# Patient Record
Sex: FEMALE | Race: WHITE
[De-identification: names, ages, dates, MRNs, and addresses within clinical notes are randomized per-mention and may not be internally consistent; named-entity substitution may affect disease eponyms.]

---
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
## 2018-09-02 ENCOUNTER — HOSPITAL ENCOUNTER (INPATIENT)
Age: 18
LOS: 2 days | Discharge: HOME | DRG: 781 | End: 2018-09-04

## 2018-09-02 ENCOUNTER — HOSPITAL ENCOUNTER (INPATIENT)
Dept: HOSPITAL 91 - OBT | Age: 18
LOS: 2 days | Discharge: HOME | DRG: 781 | End: 2018-09-04
Payer: COMMERCIAL

## 2018-09-02 DIAGNOSIS — Z3A.30: ICD-10-CM

## 2018-09-02 DIAGNOSIS — O23.43: Primary | ICD-10-CM

## 2018-09-02 DIAGNOSIS — O47.1: ICD-10-CM

## 2018-09-02 LAB
ADD MAN DIFF?: NO
ADD UMIC: YES
BASOPHIL #: 0 10^3/UL (ref 0–0.1)
BASOPHILS %: 0.3 % (ref 0–2)
EOSINOPHILS #: 0.1 10^3/UL (ref 0–0.5)
EOSINOPHILS %: 0.8 % (ref 0–7)
FETAL FIBRONECTIN: NEGATIVE
HEMATOCRIT: 32.7 % (ref 37–47)
HEMOGLOBIN: 10.9 G/DL (ref 12–16)
IMMATURE GRANS #M: 0.09 10^3/UL (ref 0–0.03)
IMMATURE GRANS % (M): 0.9 % (ref 0–0.43)
LYMPHOCYTES #: 2.6 10^3/UL (ref 0.8–2.9)
LYMPHOCYTES %: 26.3 % (ref 18–55)
MEAN CORPUSCULAR HEMOGLOBIN: 31.5 PG (ref 29–33)
MEAN CORPUSCULAR HGB CONC: 33.3 G/DL (ref 32–37)
MEAN CORPUSCULAR VOLUME: 94.5 FL (ref 72–104)
MEAN PLATELET VOLUME: 10.8 FL (ref 7.4–10.4)
MONOCYTE #: 0.5 10^3/UL (ref 0.3–0.9)
MONOCYTES %: 5.4 % (ref 0–13)
NEUTROPHIL #: 6.6 10^3/UL (ref 1.6–7.5)
NEUTROPHILS %: 66.3 % (ref 30–74)
NUCLEATED RED BLOOD CELLS #: 0 10^3/UL (ref 0–0)
NUCLEATED RED BLOOD CELLS%: 0 /100WBC (ref 0–0)
PLATELET COUNT: 242 10^3/UL (ref 140–415)
RED BLOOD COUNT: 3.46 10^6/UL (ref 4.2–5.4)
RED CELL DISTRIBUTION WIDTH: 12.2 % (ref 11.5–14.5)
UR ASCORBIC ACID: NEGATIVE MG/DL
UR BACTERIA: (no result) /HPF
UR BILIRUBIN (DIP): NEGATIVE MG/DL
UR BLOOD (DIP): (no result) MG/DL
UR CLARITY: (no result)
UR COLOR: YELLOW
UR GLUCOSE (DIP): NEGATIVE MG/DL
UR KETONES (DIP): NEGATIVE MG/DL
UR LEUKOCYTE ESTERASE (DIP): (no result) LEU/UL
UR NITRITE (DIP): NEGATIVE MG/DL
UR PH (DIP): 7 (ref 5–9)
UR RBC: 4 /HPF (ref 0–5)
UR SPECIFIC GRAVITY (DIP): 1.01 (ref 1–1.03)
UR SQUAMOUS EPITHELIAL CELL: (no result) /HPF
UR TOTAL PROTEIN (DIP): NEGATIVE MG/DL
UR UROBILINOGEN (DIP): NEGATIVE MG/DL
UR WBC: 23 /HPF (ref 0–5)
WHITE BLOOD COUNT: 10 10^3/UL (ref 4.8–10.8)

## 2018-09-02 PROCEDURE — 82731 ASSAY OF FETAL FIBRONECTIN: CPT

## 2018-09-02 PROCEDURE — 83735 ASSAY OF MAGNESIUM: CPT

## 2018-09-02 PROCEDURE — 85025 COMPLETE CBC W/AUTO DIFF WBC: CPT

## 2018-09-02 PROCEDURE — 76817 TRANSVAGINAL US OBSTETRIC: CPT

## 2018-09-02 PROCEDURE — 76818 FETAL BIOPHYS PROFILE W/NST: CPT

## 2018-09-02 PROCEDURE — 74181 MRI ABDOMEN W/O CONTRAST: CPT

## 2018-09-02 PROCEDURE — 81001 URINALYSIS AUTO W/SCOPE: CPT

## 2018-09-02 PROCEDURE — 87086 URINE CULTURE/COLONY COUNT: CPT

## 2018-09-02 RX ADMIN — PYRIDOXINE HYDROCHLORIDE 1 MLS/HR: 100 INJECTION, SOLUTION INTRAMUSCULAR; INTRAVENOUS at 23:59

## 2018-09-02 RX ADMIN — CEFAZOLIN SODIUM 1 MLS/HR: 2 SOLUTION INTRAVENOUS at 22:00

## 2018-09-02 RX ADMIN — CEFAZOLIN SODIUM 1 MLS/HR: 2 SOLUTION INTRAVENOUS at 16:35

## 2018-09-02 RX ADMIN — MAGNESIUM SULFATE HEPTAHYDRATE 1 MLS/HR: 40 INJECTION, SOLUTION INTRAVENOUS at 18:17

## 2018-09-02 RX ADMIN — PYRIDOXINE HYDROCHLORIDE 1 MLS/HR: 100 INJECTION, SOLUTION INTRAMUSCULAR; INTRAVENOUS at 16:29

## 2018-09-02 RX ADMIN — MAGNESIUM SULFATE IN WATER 1 MLS/HR: 40 INJECTION, SOLUTION INTRAVENOUS at 18:47

## 2018-09-02 RX ADMIN — BETAMETHASONE SODIUM PHOSPHATE AND BETAMETHASONE ACETATE 1 MG: 3; 3 INJECTION, SUSPENSION INTRA-ARTICULAR; INTRALESIONAL; INTRAMUSCULAR at 16:36

## 2018-09-03 LAB
MAGNESIUM: 4.8 MG/DL (ref 1.7–2.5)
MAGNESIUM: 4.9 MG/DL (ref 1.7–2.5)
MAGNESIUM: 5.2 MG/DL (ref 1.7–2.5)

## 2018-09-03 RX ADMIN — CEFAZOLIN SODIUM 1 MLS/HR: 2 SOLUTION INTRAVENOUS at 13:54

## 2018-09-03 RX ADMIN — MAGNESIUM SULFATE IN WATER 1 MLS/HR: 40 INJECTION, SOLUTION INTRAVENOUS at 10:30

## 2018-09-03 RX ADMIN — CEFAZOLIN SODIUM 1 MLS/HR: 2 SOLUTION INTRAVENOUS at 22:07

## 2018-09-03 RX ADMIN — PYRIDOXINE HYDROCHLORIDE 1 MLS/HR: 100 INJECTION, SOLUTION INTRAMUSCULAR; INTRAVENOUS at 12:53

## 2018-09-03 RX ADMIN — BETAMETHASONE SODIUM PHOSPHATE AND BETAMETHASONE ACETATE 1 MG: 3; 3 INJECTION, SUSPENSION INTRA-ARTICULAR; INTRALESIONAL; INTRAMUSCULAR at 16:36

## 2018-09-03 RX ADMIN — MAGNESIUM SULFATE IN WATER 1 MLS/HR: 40 INJECTION, SOLUTION INTRAVENOUS at 11:47

## 2018-09-03 RX ADMIN — MAGNESIUM SULFATE IN WATER 1 MLS/HR: 40 INJECTION, SOLUTION INTRAVENOUS at 04:59

## 2018-09-03 RX ADMIN — CEFAZOLIN SODIUM 1 MLS/HR: 2 SOLUTION INTRAVENOUS at 05:56

## 2018-09-04 RX ADMIN — CEFAZOLIN SODIUM 1 MLS/HR: 2 SOLUTION INTRAVENOUS at 22:35

## 2018-09-04 RX ADMIN — CEFAZOLIN SODIUM 1 MLS/HR: 2 SOLUTION INTRAVENOUS at 05:46

## 2018-09-04 RX ADMIN — CEFAZOLIN SODIUM 1 MLS/HR: 2 SOLUTION INTRAVENOUS at 14:00

## 2018-09-04 RX ADMIN — PYRIDOXINE HYDROCHLORIDE 1 MLS/HR: 100 INJECTION, SOLUTION INTRAMUSCULAR; INTRAVENOUS at 12:38

## 2018-11-04 ENCOUNTER — HOSPITAL ENCOUNTER (INPATIENT)
Dept: HOSPITAL 91 - OBT | Age: 18
LOS: 2 days | Discharge: HOME | End: 2018-11-06
Payer: COMMERCIAL

## 2018-11-04 ENCOUNTER — HOSPITAL ENCOUNTER (INPATIENT)
Age: 18
LOS: 2 days | Discharge: HOME | End: 2018-11-06

## 2018-11-04 DIAGNOSIS — Z3A.39: ICD-10-CM

## 2018-11-04 LAB
ADD MAN DIFF?: NO
BASOPHIL #: 0 10^3/UL (ref 0–0.1)
BASOPHILS %: 0.2 % (ref 0–2)
EOSINOPHILS #: 0.1 10^3/UL (ref 0–0.5)
EOSINOPHILS %: 1 % (ref 0–7)
HEMATOCRIT: 31.7 % (ref 37–47)
HEMOGLOBIN: 10.2 G/DL (ref 12–16)
HEPATITIS B SURFACE ANTIGEN: NEGATIVE
IMMATURE GRANS #M: 0.05 10^3/UL (ref 0–0.03)
IMMATURE GRANS % (M): 0.4 % (ref 0–0.43)
INR: 0.93
LYMPHOCYTES #: 3.8 10^3/UL (ref 0.8–2.9)
LYMPHOCYTES %: 29.1 % (ref 18–55)
MEAN CORPUSCULAR HEMOGLOBIN: 29.1 PG (ref 29–33)
MEAN CORPUSCULAR HGB CONC: 32.2 G/DL (ref 32–37)
MEAN CORPUSCULAR VOLUME: 90.6 FL (ref 72–104)
MEAN PLATELET VOLUME: 11.4 FL (ref 7.4–10.4)
MONOCYTE #: 0.7 10^3/UL (ref 0.3–0.9)
MONOCYTES %: 5.1 % (ref 0–13)
NEUTROPHIL #: 8.3 10^3/UL (ref 1.6–7.5)
NEUTROPHILS %: 64.2 % (ref 30–74)
NUCLEATED RED BLOOD CELLS #: 0 10^3/UL (ref 0–0)
NUCLEATED RED BLOOD CELLS%: 0 /100WBC (ref 0–0)
PARTIAL THROMBOPLASTIN TIME: 29.7 SEC (ref 23–35)
PLATELET COUNT: 257 10^3/UL (ref 140–415)
PROTIME: 12.5 SEC (ref 11.9–14.9)
PT RATIO: 1
RAPID PLASMA REAGIN: REACTIVE
RED BLOOD COUNT: 3.5 10^6/UL (ref 4.2–5.4)
RED CELL DISTRIBUTION WIDTH: 13.4 % (ref 11.5–14.5)
RPR TITER: (no result)
WHITE BLOOD COUNT: 12.9 10^3/UL (ref 4.8–10.8)

## 2018-11-04 PROCEDURE — 62319: CPT

## 2018-11-04 PROCEDURE — 86592 SYPHILIS TEST NON-TREP QUAL: CPT

## 2018-11-04 PROCEDURE — 85025 COMPLETE CBC W/AUTO DIFF WBC: CPT

## 2018-11-04 PROCEDURE — 87340 HEPATITIS B SURFACE AG IA: CPT

## 2018-11-04 PROCEDURE — 3E033VJ INTRODUCTION OF OTHER HORMONE INTO PERIPHERAL VEIN, PERCUTANEOUS APPROACH: ICD-10-PCS

## 2018-11-04 PROCEDURE — 86900 BLOOD TYPING SEROLOGIC ABO: CPT

## 2018-11-04 PROCEDURE — 80307 DRUG TEST PRSMV CHEM ANLYZR: CPT

## 2018-11-04 PROCEDURE — 86850 RBC ANTIBODY SCREEN: CPT

## 2018-11-04 PROCEDURE — 86901 BLOOD TYPING SEROLOGIC RH(D): CPT

## 2018-11-04 PROCEDURE — 76815 OB US LIMITED FETUS(S): CPT

## 2018-11-04 PROCEDURE — 85610 PROTHROMBIN TIME: CPT

## 2018-11-04 PROCEDURE — 88307 TISSUE EXAM BY PATHOLOGIST: CPT

## 2018-11-04 PROCEDURE — 90715 TDAP VACCINE 7 YRS/> IM: CPT

## 2018-11-04 PROCEDURE — 85730 THROMBOPLASTIN TIME PARTIAL: CPT

## 2018-11-04 RX ADMIN — Medication 1 ML: at 11:56

## 2018-11-04 RX ADMIN — Medication 7 APPLIC: at 17:54

## 2018-11-04 RX ADMIN — PENICILLIN G BENZATHINE 1 UNITS: 2400000 INJECTION, SUSPENSION INTRAMUSCULAR at 08:02

## 2018-11-04 RX ADMIN — IBUPROFEN 1 MG: 600 TABLET ORAL at 15:20

## 2018-11-04 RX ADMIN — HETASTARCH IN SODIUM CHLORIDE 1 ML: 6; .9 INJECTION, SOLUTION INTRAVENOUS at 09:35

## 2018-11-04 RX ADMIN — Medication 56 SPRAY: at 17:53

## 2018-11-04 RX ADMIN — PYRIDOXINE HYDROCHLORIDE 1 MLS/HR: 100 INJECTION, SOLUTION INTRAMUSCULAR; INTRAVENOUS at 08:09

## 2018-11-04 RX ADMIN — PYRIDOXINE HYDROCHLORIDE 1 MLS/HR: 100 INJECTION, SOLUTION INTRAMUSCULAR; INTRAVENOUS at 07:17

## 2018-11-04 RX ADMIN — DOCUSATE SODIUM AND SENNOSIDES 1 TAB: 8.6; 5 TABLET, FILM COATED ORAL at 21:32

## 2018-11-04 RX ADMIN — PYRIDOXINE HYDROCHLORIDE 1 MLS/HR: 100 INJECTION, SOLUTION INTRAMUSCULAR; INTRAVENOUS at 09:22

## 2018-11-04 RX ADMIN — METHYLERGONOVINE MALEATE 1 MG: 0.2 INJECTION, SOLUTION INTRAMUSCULAR; INTRAVENOUS at 11:58

## 2018-11-04 RX ADMIN — Medication 1 MLS/HR: at 09:22

## 2018-11-04 RX ADMIN — PYRIDOXINE HYDROCHLORIDE 1 MLS/HR: 100 INJECTION, SOLUTION INTRAMUSCULAR; INTRAVENOUS at 07:52

## 2018-11-04 RX ADMIN — Medication 1 MLS/HR: at 12:27

## 2018-11-04 RX ADMIN — IBUPROFEN 1 MG: 600 TABLET ORAL at 17:53

## 2018-11-05 LAB
ADD MAN DIFF?: NO
BASOPHIL #: 0 10^3/UL (ref 0–0.1)
BASOPHILS %: 0.2 % (ref 0–2)
EOSINOPHILS #: 0.1 10^3/UL (ref 0–0.5)
EOSINOPHILS %: 1 % (ref 0–7)
HEMATOCRIT: 27.6 % (ref 37–47)
HEMOGLOBIN: 8.7 G/DL (ref 12–16)
IMMATURE GRANS #M: 0.04 10^3/UL (ref 0–0.03)
IMMATURE GRANS % (M): 0.3 % (ref 0–0.43)
LYMPHOCYTES #: 3.7 10^3/UL (ref 0.8–2.9)
LYMPHOCYTES %: 29.2 % (ref 18–55)
MEAN CORPUSCULAR HEMOGLOBIN: 28.8 PG (ref 29–33)
MEAN CORPUSCULAR HGB CONC: 31.5 G/DL (ref 32–37)
MEAN CORPUSCULAR VOLUME: 91.4 FL (ref 72–104)
MEAN PLATELET VOLUME: 11.5 FL (ref 7.4–10.4)
MONOCYTE #: 0.8 10^3/UL (ref 0.3–0.9)
MONOCYTES %: 6.2 % (ref 0–13)
NEUTROPHIL #: 8 10^3/UL (ref 1.6–7.5)
NEUTROPHILS %: 63.1 % (ref 30–74)
NUCLEATED RED BLOOD CELLS #: 0 10^3/UL (ref 0–0)
NUCLEATED RED BLOOD CELLS%: 0 /100WBC (ref 0–0)
PLATELET COUNT: 222 10^3/UL (ref 140–415)
RED BLOOD COUNT: 3.02 10^6/UL (ref 4.2–5.4)
RED CELL DISTRIBUTION WIDTH: 13.5 % (ref 11.5–14.5)
WHITE BLOOD COUNT: 12.7 10^3/UL (ref 4.8–10.8)

## 2018-11-05 RX ADMIN — IBUPROFEN 1 MG: 600 TABLET ORAL at 17:52

## 2018-11-05 RX ADMIN — IBUPROFEN 1 MG: 600 TABLET ORAL at 11:35

## 2018-11-05 RX ADMIN — IBUPROFEN 1 MG: 600 TABLET ORAL at 05:31

## 2018-11-05 RX ADMIN — IBUPROFEN 1 MG: 600 TABLET ORAL at 00:06

## 2018-11-06 RX ADMIN — CLOSTRIDIUM TETANI TOXOID ANTIGEN (FORMALDEHYDE INACTIVATED), CORYNEBACTERIUM DIPHTHERIAE TOXOID ANTIGEN (FORMALDEHYDE INACTIVATED), BORDETELLA PERTUSSIS TOXOID ANTIGEN (GLUTARALDEHYDE INACTIVATED), BORDETELLA PERTUSSIS FILAMENTOUS HEMAGGLUTININ ANTIGEN (FORMALDEHYDE INACTIVATED), BORDETELLA PERTUSSIS PERTACTIN ANTIGEN, AND BORDETELLA PERTUSSIS FIMBRIAE 2/3 ANTIGEN 1 ML: 5; 2; 2.5; 5; 3; 5 INJECTION, SUSPENSION INTRAMUSCULAR at 10:27

## 2018-11-06 RX ADMIN — IBUPROFEN 1 MG: 600 TABLET ORAL at 05:55

## 2018-11-06 RX ADMIN — IBUPROFEN 1 MG: 600 TABLET ORAL at 14:37

## 2018-11-06 RX ADMIN — MEDROXYPROGESTERONE ACETATE 1 MG: 150 INJECTION, SUSPENSION INTRAMUSCULAR at 11:30

## 2018-11-06 RX ADMIN — IBUPROFEN 1 MG: 600 TABLET ORAL at 00:06

## 2018-11-07 LAB — FLUORESCENT TREPONEMAL AB: REACTIVE

## 2018-12-05 ENCOUNTER — HOSPITAL ENCOUNTER (EMERGENCY)
Dept: HOSPITAL 91 - FTE | Age: 18
Discharge: LEFT BEFORE DISCHARGE | End: 2018-12-05
Payer: SELF-PAY

## 2018-12-05 ENCOUNTER — HOSPITAL ENCOUNTER (EMERGENCY)
Age: 18
Discharge: LEFT BEFORE DISCHARGE | End: 2018-12-05

## 2018-12-05 DIAGNOSIS — Z53.21: Primary | ICD-10-CM
